# Patient Record
(demographics unavailable — no encounter records)

---

## 2024-11-12 NOTE — ASSESSMENT
[FreeTextEntry1] : 31 yo F previously diagnosed with EoE and tx with PPI with discontinuation of therapy and recent hospitalization for dysphagia s/p EGD with largely unrevealing esophageal bx.  Describes clear hx of histologic diagnosis of EoE, though curiously on most recent EGD for dysphagia during her admission to [facility], esophageal biopsies done OFF PPI were not consistent with EoE with only 4 eos/HPF in lower esophagus and none in mid/upper.  This raises the question of whether initial diagnosis was true EoE and whether alternative dx explains ongoing dysphagia.  At this point, reasonable to pursue motility evaluation to consider alternative etiologies.  Non erosive GERD also on ddx given mild eosinophils in lower esophagus.  Recommendations: -Caution with difficult to chew foods, though esophagus widely patent on EGD -Continue PPI; can consider BID for 1 week to see if this results in improved sx -Barium esophagram WITH tablet -Will refer to HRM for motility eval -If above unrevealing, would discuss esophageal pH monitoring OFF PPI.  F/u in January to review above  Luan Reyes DO GI Fellow [facility]  The above recommendations were discussed at the time of the visit with the co-signing attending. Please see attending addendum for any additional recommendations.

## 2024-11-12 NOTE — END OF VISIT
[] : Fellow [FreeTextEntry3] : Pt seen and d/w fellow.  Will arrange an esophagram and manometry for further evaluation.  F/u in office afterwards.

## 2024-11-12 NOTE — HISTORY OF PRESENT ILLNESS
[FreeTextEntry1] : 31 yo F with a hx of IBS and EoE (dx May 2023) s/p recent hospitalization for dysphagia on 9/12 presents today for initial visit following discharge.  Has had chronic dyphagia however woke up on AM of admission and felt sensation of something in her throat.  Initially not able to tolerate secretions but then was able to do so and tolerate liquids, though she does feel some discomfort with swallowing.  Was dx with EoE in May 2023 after having longstanding dysphagia/GERD sx.  [place] is when she was told she had EoE. Was started on PPI and then underwent subsequent EGD where she was told she had a good response with therapy.  However she then stopped PPI and began using PPI PRN.  Then relocated to [facility] and has not yet established care. Unfortunately over last year while she has been on PRN PPI has felt frequent dysphagia. Occasionally feels food gets stuck but eventually it passes.  Has never required emergent EGD for food impaction.  No NSAID use. No anticoag/antiplt therapy.   Describes seasonal allergies but otherwise not atopy.  Underwent EGD while admitted showing the following:  Esophagus: Diffuse corrugated mucosa and linear furrows of the mucosa was noted in the whole esophagus. These findings are compatible with esophagitis. These findings were suggestive of eosinophilic esophagitis. EREFS score of 4. No strictures. Six cold forceps random only biopsies were performed for histology in the lower third of the esophagus, middle third of the esophagus and upper third of the esophagus. This was done to evaluate for eosinophilic esophagitis. Stomach: Granularity of the mucosa was noted in the stomach. Four cold forceps both targeted and random biopsies were performed for histology in the stomach antrum and stomach body. Normal duodenum.  PATH from EGD bx as follows:  1.  Gastric antrum, biopsy: -   Gastric antral mucosa with scant focal acute gastritis -   No H. pylori identified by H&E examination  2.  Gastric body, biopsy: -   Gastric oxyntic mucosa with no significant histologic abnormality -   No H. pylori identified by H&E examination  3.  Lower esophagus, biopsy: -   Esophageal squamous epithelium with changes suggestive of mild gastroesophageal reflux -   Note: Occasional eosinophiles are present up to 4 per high-power field_  4.  Midesophagus, biopsy: -   Esophageal squamous epithelium with no significant histologic abnormality -   Node: No eosinophils seen  5.  Upper esophagus, biopsy: -   Esophageal squamous epithelium with no significant histologic abnormality -   Note: No eosinophiles seen  Had colonoscopy in Jan 2024 for IBS sx but recalls this was normal.  Discharged with protonix 40 mg daily which she has been adherent with since discharge. She continues to experience esophageal dysphagia with solids only. Specifically mentions steak/red meat as a triggering food. No dysphagia with liquids. No vomiting and has not returned to ED with impaction concerns since.  Otherwise ROS unremarkable as noted  PMhx: IBS Allergies: No Known Allergies Home Medications: Protonix 40 mg daily Surgical Hx: No relevant GI surgery Family hx: No relevant GI associated malignancy Social Hx: Non smoker

## 2024-11-12 NOTE — ASSESSMENT
[FreeTextEntry1] : 31 yo F previously diagnosed with EoE and tx with PPI with discontinuation of therapy and recent hospitalization for dysphagia s/p EGD with largely unrevealing esophageal bx.  Describes clear hx of histologic diagnosis of EoE, though curiously on most recent EGD for dysphagia during her admission to [facility], esophageal biopsies done OFF PPI were not consistent with EoE with only 4 eos/HPF in lower esophagus and none in mid/upper.  This raises the question of whether initial diagnosis was true EoE and whether alternative dx explains ongoing dysphagia.  At this point, reasonable to pursue motility evaluation to consider alternative etiologies.  Non erosive GERD also on ddx given mild eosinophils in lower esophagus.  Recommendations: -Caution with difficult to chew foods, though esophagus widely patent on EGD -Continue PPI; can consider BID for 1 week to see if this results in improved sx -Barium esophagram WITH tablet -Will refer to HRM for motility eval -If above unrevealing, would discuss esophageal pH monitoring OFF PPI.  F/u in January to review above  Luan Reyes DO GI Fellow [facility]  The above recommendations were discussed at the time of the visit with the co-signing attending. Please see attending addendum for any additional recommendations.

## 2024-11-12 NOTE — PHYSICAL EXAM
[Normal Appearance] : the appearance of the neck was normal [No Respiratory Distress] : no respiratory distress [No Acc Muscle Use] : no accessory muscle use [Respiration, Rhythm And Depth] : normal respiratory rhythm and effort [Normal] : oriented to person, place, and time [de-identified] : Non distended

## 2024-11-12 NOTE — HISTORY OF PRESENT ILLNESS
[FreeTextEntry1] : 31 yo F with a hx of IBS and EoE (dx May 2023) s/p recent hospitalization for dysphagia on 9/12 presents today for initial visit following discharge.  Has had chronic dyphagia however woke up on AM of admission and felt sensation of something in her throat.  Initially not able to tolerate secretions but then was able to do so and tolerate liquids, though she does feel some discomfort with swallowing.  Was dx with EoE in May 2023 after having longstanding dysphagia/GERD sx.  [place] when she was told she had EoE. Was started on PPI and then underwent subsequent EGD where she was told she had a good response with therapy.  However she then stopped PPI and began using PPI PRN.  Then relocated to [facility] and has not yet established care. Unfortunately over last year while she has been on PRN PPI has felt frequent dysphagia. Occasionally feels food gets stuck but eventually it passes.  Has never required emergent EGD for food impaction.  No NSAID use. No anticoag/antiplt therapy.   Describes seasonal allergies but otherwise not atopy.  Underwent EGD while admitted showing the following:  Esophagus: Diffuse corrugated mucosa and linear furrows of the mucosa was noted in the whole esophagus. These findings are compatible with esophagitis. These findings were suggestive of eosinophilic esophagitis. EREFS score of 4. No strictures. Six cold forceps random only biopsies were performed for histology in the lower third of the esophagus, middle third of the esophagus and upper third of the esophagus. This was done to evaluate for eosinophilic esophagitis. Stomach: Granularity of the mucosa was noted in the stomach. Four cold forceps both targeted and random biopsies were performed for histology in the stomach antrum and stomach body. Normal duodenum.  PATH from EGD bx as follows:  1.  Gastric antrum, biopsy: -   Gastric antral mucosa with scant focal acute gastritis -   No H. pylori identified by H&E examination  2.  Gastric body, biopsy: -   Gastric oxyntic mucosa with no significant histologic abnormality -   No H. pylori identified by H&E examination  3.  Lower esophagus, biopsy: -   Esophageal squamous epithelium with changes suggestive of mild gastroesophageal reflux -   Note: Occasional eosinophiles are present up to 4 per high-power field_  4.  Midesophagus, biopsy: -   Esophageal squamous epithelium with no significant histologic abnormality -   Node: No eosinophils seen  5.  Upper esophagus, biopsy: -   Esophageal squamous epithelium with no significant histologic abnormality -   Note: No eosinophiles seen  Had colonoscopy in Jan 2024 for IBS sx but recalls this was normal.  Discharged with protonix 40 mg daily which she has been adherent with since discharge. She continues to experience esophageal dysphagia with solids only. Specifically mentions steak/red meat as a triggering food. No dysphagia with liquids. No vomiting and has not returned to ED with impaction concerns since.  Otherwise ROS unremarkable as noted  PMhx: IBS Allergies: No Known Allergies Home Medications: Protonix 40 mg daily Surgical Hx: No relevant GI surgery Family hx: No relevant GI associated malignancy Social Hx: Non smoker

## 2024-11-12 NOTE — PHYSICAL EXAM
[Normal Appearance] : the appearance of the neck was normal [No Respiratory Distress] : no respiratory distress [No Acc Muscle Use] : no accessory muscle use [Respiration, Rhythm And Depth] : normal respiratory rhythm and effort [Normal] : oriented to person, place, and time [de-identified] : Non distended

## 2025-02-03 NOTE — ASSESSMENT
[FreeTextEntry1] : 32 F with a hx of IBS and EoE (dx May 2023) s/p recent hospitalization for dysphagia on 9/24 presents today HRM consultation. Referred by Dr. Reyes.   Dysphagia Risks including bleeding from nostril, sinus infection, sore throat. Instructed that the NP will be performing the procedure and the study is analyzed by the physician.  Patient to be NPO for at least 8 hours prior to the test. Patient instructed to avoid taking any prokinetic meds, muscle relaxants, opiate pain meds, Valium, Xanax, Ativan,etc.  All questions were answered. The patient expressed understanding of these risks and is agreeable to proceed.  The  to confirm appointment with patient.

## 2025-02-03 NOTE — HISTORY OF PRESENT ILLNESS
[FreeTextEntry1] : 32 F with a hx of IBS and EoE (dx May 2023) s/p recent hospitalization for dysphagia on 9/24 presents today HRM consultation. Referred by Dr. Reyes.   1/31/25 Often gets feeling of food getting stuck. Not certain trigger foods that she can recognize. Also, gets acid reflux occasionally 1-2 times per month where will feel food come up to her throat.   Previous hx:  Has had chronic dyphagia however woke up on AM of admission and felt sensation of something in her throat.  Initially not able to tolerate secretions but then was able to do so and tolerate liquids, though she does feel some discomfort with swallowing.  Was dx with EoE in May 2023 after having longstanding dysphagia/GERD sx.  [place] was told she had EoE. Was started on PPI and then underwent subsequent EGD where she was told she had a good response with therapy.  However she then stopped PPI and began using PPI PRN.  Then relocated to [facility] and has not yet established care. Unfortunately over last year while she has been on PRN PPI has felt frequent dysphagia. Occasionally feels food gets stuck but eventually it passes.  Has never required emergent EGD for food impaction.  No NSAID use. No anticoag/antiplt therapy.   Describes seasonal allergies but otherwise not atopy.  Underwent EGD while admitted showing the following:  Esophagus: Diffuse corrugated mucosa and linear furrows of the mucosa was noted in the whole esophagus. These findings are compatible with esophagitis. These findings were suggestive of eosinophilic esophagitis. EREFS score of 4. No strictures. Six cold forceps random only biopsies were performed for histology in the lower third of the esophagus, middle third of the esophagus and upper third of the esophagus. This was done to evaluate for eosinophilic esophagitis. Stomach: Granularity of the mucosa was noted in the stomach. Four cold forceps both targeted and random biopsies were performed for histology in the stomach antrum and stomach body. Normal duodenum.  PATH from EGD bx as follows:  1.  Gastric antrum, biopsy: -   Gastric antral mucosa with scant focal acute gastritis -   No H. pylori identified by H&E examination  2.  Gastric body, biopsy: -   Gastric oxyntic mucosa with no significant histologic abnormality -   No H. pylori identified by H&E examination  3.  Lower esophagus, biopsy: -   Esophageal squamous epithelium with changes suggestive of mild gastroesophageal reflux -   Note: Occasional eosinophiles are present up to 4 per high-power field_  4.  Midesophagus, biopsy: -   Esophageal squamous epithelium with no significant histologic abnormality -   Node: No eosinophils seen  5.  Upper esophagus, biopsy: -   Esophageal squamous epithelium with no significant histologic abnormality -   Note: No eosinophiles seen  Had colonoscopy in Jan 2024 for IBS sx but recalls this was normal.  Discharged with protonix 40 mg daily which she has been adherent with since discharge. She continues to experience esophageal dysphagia with solids only. Specifically mentions steak/red meat as a triggering food. No dysphagia with liquids. No vomiting and has not returned to ED with impaction concerns since.  Otherwise ROS unremarkable as noted  PMhx: IBS Allergies: No Known Allergies Home Medications: Protonix 40 mg daily Surgical Hx: No relevant GI surgery Family hx: No relevant GI associated malignancy Social Hx: Non smoker

## 2025-02-03 NOTE — HISTORY OF PRESENT ILLNESS
[FreeTextEntry1] : 32 F with a hx of IBS and EoE (dx May 2023) s/p recent hospitalization for dysphagia on 9/24 presents today HRM consultation. Referred by Dr. Reyes.   1/31/25 Often gets feeling of food getting stuck. Not certain trigger foods that she can recognize. Also, gets acid reflux occasionally 1-2 times per month where will feel food come up to her throat.   Previous hx:  Has had chronic dyphagia however woke up on AM of admission and felt sensation of something in her throat.  Initially not able to tolerate secretions but then was able to do so and tolerate liquids, though she does feel some discomfort with swallowing.  Was dx with EoE in May 2023 after having longstanding dysphagia/GERD sx.  [place] when [place] was told she had EoE. Was started on PPI and then underwent subsequent EGD where she was told she had a good response with therapy.  However she then stopped PPI and began using PPI PRN.  Then relocated to [facility] and has not yet established care. Unfortunately over last year while she has been on PRN PPI has felt frequent dysphagia. Occasionally feels food gets stuck but eventually it passes.  Has never required emergent EGD for food impaction.  No NSAID use. No anticoag/antiplt therapy.   Describes seasonal allergies but otherwise not atopy.  Underwent EGD while admitted showing the following:  Esophagus: Diffuse corrugated mucosa and linear furrows of the mucosa was noted in the whole esophagus. These findings are compatible with esophagitis. These findings were suggestive of eosinophilic esophagitis. EREFS score of 4. No strictures. Six cold forceps random only biopsies were performed for histology in the lower third of the esophagus, middle third of the esophagus and upper third of the esophagus. This was done to evaluate for eosinophilic esophagitis. Stomach: Granularity of the mucosa was noted in the stomach. Four cold forceps both targeted and random biopsies were performed for histology in the stomach antrum and stomach body. Normal duodenum.  PATH from EGD bx as follows:  1.  Gastric antrum, biopsy: -   Gastric antral mucosa with scant focal acute gastritis -   No H. pylori identified by H&E examination  2.  Gastric body, biopsy: -   Gastric oxyntic mucosa with no significant histologic abnormality -   No H. pylori identified by H&E examination  3.  Lower esophagus, biopsy: -   Esophageal squamous epithelium with changes suggestive of mild gastroesophageal reflux -   Note: Occasional eosinophiles are present up to 4 per high-power field_  4.  Midesophagus, biopsy: -   Esophageal squamous epithelium with no significant histologic abnormality -   Node: No eosinophils seen  5.  Upper esophagus, biopsy: -   Esophageal squamous epithelium with no significant histologic abnormality -   Note: No eosinophiles seen  Had colonoscopy in Jan 2024 for IBS sx but recalls this was normal.  Discharged with protonix 40 mg daily which she has been adherent with since discharge. She continues to experience esophageal dysphagia with solids only. Specifically mentions steak/red meat as a triggering food. No dysphagia with liquids. No vomiting and has not returned to ED with impaction concerns since.  Otherwise ROS unremarkable as noted  PMhx: IBS Allergies: No Known Allergies Home Medications: Protonix 40 mg daily Surgical Hx: No relevant GI surgery Family hx: No relevant GI associated malignancy Social Hx: Non smoker

## 2025-02-03 NOTE — REASON FOR VISIT
[Home] : at home, [unfilled] , at the time of the visit. [Medical Office: ([facility])___] : at the medical office located in  [Patient] : the patient [Initial Evaluation] : an initial evaluation [FreeTextEntry2] : Diamond Guillermo  [FreeTextEntry1] : Dysphagia

## 2025-02-03 NOTE — REASON FOR VISIT
[Home] : at home, [unfilled] , at the time of the visit. [Medical Office: ([facility])___] : at the medical office located in  [Patient] : the patient [Initial Evaluation] : an initial evaluation [FreeTextEntry2] : Diamond Guillermo  [FreeTextEntry1] : Dysphagia

## 2025-05-13 NOTE — ASSESSMENT
[FreeTextEntry1] : #Skin cancer screening  Sun protection reviewed. The patient was educated regarding appropriate sun protection measures, including wearing sunscreen with SPF 30 or higher when outdoors, sun protective clothing, and sun avoidance.   #Benign appearing nevi Patient was reassured of the benign nature of these findings. No further treatment needed at this time. If any lesion changes or becomes symptomatic I recommend follow-up  # Chronic pruritus- scalp, upper back, upper arms ddx: seb derm vs. chronic contact dermatitis  previously tried and failed: ketoconazole shampoo, fluocinonide .05 sol -switch shampoo and conditioner and all other products to fragrance free., suggested Vanicream or CeraVe products. samples given -start zoryve 0.3 foam daily to AA -follow up if no improvement 2-3 month  # Neoplasm of Uncertain Behavior Location: REAL anterior Differential Diagnosis: nevus, DN, MM Recommendation: skin biopsy by shave technique   Clinical photographs were recommended in order to document the appearance and location of skin lesion/s.  Verbal consent obtained from the patient to proceed with photography. These photos will remain in the patient's electronic health record.   Biopsy by Shave Technique- Procedure Note Verbal consent was obtained and a time out was performed. The patient was counseled about the risk of bleeding, scar, and infection. The area was cleaned with an alcohol swab. Anesthesia: 1% lidocaine with 1:100,000 epinephrine buffered with sodium bicarbonate Procedure: Biopsy by shave technique The specimen was sent for pathologic examination. Hemostasis: aluminum chloride A bandage was placed and wound care was reviewed with the patient.  # Neoplasm of Uncertain Behavior Location: REAL posterior Differential Diagnosis: N, DN, MM Recommendation: skin biopsy by shave technique   Clinical photographs were recommended in order to document the appearance and location of skin lesion/s.  Verbal consent obtained from the patient to proceed with photography. These photos will remain in the patient's electronic health record.   Biopsy by Shave Technique- Procedure Note Verbal consent was obtained and a time out was performed. The patient was counseled about the risk of bleeding, scar, and infection. The area was cleaned with an alcohol swab. Anesthesia: 1% lidocaine with 1:100,000 epinephrine buffered with sodium bicarbonate Procedure: Biopsy by shave technique The specimen was sent for pathologic examination. Hemostasis: aluminum chloride A bandage was placed and wound care was reviewed with the patient.  RTC 1 year FBSE or sooner prn

## 2025-05-13 NOTE — PHYSICAL EXAM
[Alert] : alert [Oriented x 3] : ~L oriented x 3 [Well Nourished] : well nourished [Conjunctiva Non-injected] : conjunctiva non-injected [No Visual Lymphadenopathy] : no visual  lymphadenopathy [No Clubbing] : no clubbing [No Edema] : no edema [No Bromhidrosis] : no bromhidrosis [No Chromhidrosis] : no chromhidrosis [FreeTextEntry3] : Exam of external genitalia was deferred.  -On the trunk and upper/lower extremities bilaterally, are many scattered tan to pink and brown macules and papules with regular borders. Some of these were examined dermoscopically and had reassuring features. -scalp, upper back, shoulders and upper arm: ill defined pink scaling patches. +strong perfume fragrance - REAL anterior: pink firm indurated papule  - REAL posterior: dark brown irregular pigmented papules Female

## 2025-05-13 NOTE — HISTORY OF PRESENT ILLNESS
[FreeTextEntry1] : NPA - FBSE [de-identified] : Diamond Vasquez 33y/o F presents for FBSE.  #itchy, flaking scalp- kcn 2% shampoo , fluo solution prn. used years ago, didnt work very well.  since she was teenagers. washes hair every 2 days. gets chronic itching back of arms and upper back as well for years. no known prior hx childhood eczema, asthma, hay fevers. #new and changing mole- REAL  PHx of skin cancer: no FHx of skin cancer: yes father w SCC. no fhx melanoma H/x of blistering sunburns: yes  H/x of tanning bed use: yes Uses sunscreen regularly: no